# Patient Record
Sex: FEMALE | Race: WHITE | Employment: UNEMPLOYED | URBAN - METROPOLITAN AREA
[De-identification: names, ages, dates, MRNs, and addresses within clinical notes are randomized per-mention and may not be internally consistent; named-entity substitution may affect disease eponyms.]

---

## 2017-11-14 PROBLEM — F31.81 BIPOLAR II DISORDER (HCC): Status: ACTIVE | Noted: 2017-11-14

## 2017-12-19 PROBLEM — F41.9 ANXIETY DISORDER, UNSPECIFIED: Status: ACTIVE | Noted: 2017-12-19

## 2018-03-01 ENCOUNTER — OFFICE VISIT (OUTPATIENT)
Dept: FAMILY MEDICINE CLINIC | Facility: CLINIC | Age: 23
End: 2018-03-01

## 2018-03-01 VITALS
SYSTOLIC BLOOD PRESSURE: 130 MMHG | OXYGEN SATURATION: 98 % | HEART RATE: 89 BPM | TEMPERATURE: 98 F | WEIGHT: 196 LBS | BODY MASS INDEX: 29 KG/M2 | RESPIRATION RATE: 18 BRPM | DIASTOLIC BLOOD PRESSURE: 60 MMHG

## 2018-03-01 DIAGNOSIS — Z02.9 ENCOUNTERS FOR ADMINISTRATIVE PURPOSES: Primary | ICD-10-CM

## 2018-03-01 NOTE — PROGRESS NOTES
Patient reports to clinic for work physical.  She will be working in a  as a . Patient was recently diagnosed with bipolar.   She was recently admitted to inpatient for bipolar and has been seeing a therapist.  Patient is on no medication

## 2018-12-27 PROCEDURE — 88175 CYTOPATH C/V AUTO FLUID REDO: CPT | Performed by: NURSE PRACTITIONER

## 2019-03-06 PROCEDURE — 88305 TISSUE EXAM BY PATHOLOGIST: CPT | Performed by: OBSTETRICS & GYNECOLOGY

## 2025-02-11 NOTE — ED PROVIDER NOTES
Patient Seen in: [facility]      History     Chief Complaint   Patient presents with    Dizziness     Stated Complaint: dizziness that started a few hours ago, similar episodes over the past few weeks    Subjective:   HPI      29-year-old female comes to the hospital having episodes of lightheadedness over the last few hours.  She has had some similar in the past.  Is oftentimes associate with headaches.  She does have a headache now she is rating it a 4 out of 10 but was more intense earlier.  In addition she has sensation of having some floaters in her eyes with this.  She is denying any fevers or chills.  She has no nausea, vomiting or diarrhea.  She has no chest pain or shortness of breath.  She denies any abdominal pains.  She just finished her menstrual cycle which was normal today.  Nothing specific makes things better or worse and she denying any other complaints.    Objective:     Past Medical History:    Bipolar disorder (HCC)    Uterine polyp              Past Surgical History:   Procedure Laterality Date    Other surgical history      removal of uterine polyps                Social History     Socioeconomic History    Marital status: Single   Tobacco Use    Smoking status: Never    Smokeless tobacco: Never   Substance and Sexual Activity    Alcohol use: Yes     Alcohol/week: 8.0 standard drinks of alcohol     Types: 8 Shots of liquor per week     Comment: 1-2 x week    Drug use: No    Sexual activity: Yes     Social Drivers of Health      Received from [facility]                  Physical Exam     ED Triage Vitals [02/10/25 1834]   BP (!) 150/95   Pulse 77   Resp 16   Temp 98.5 °F (36.9 °C)   Temp src Oral   SpO2 100 %   O2 Device None (Room air)       Current Vitals:   Vital Signs  BP: (!) 150/95  Pulse: 77  Resp: 16  Temp: 98.5 °F (36.9 °C)  Temp src: Oral    Oxygen Therapy  SpO2: 100 %  O2 Device: None (Room air)        Physical Exam  HEENT : NCAT,  EOMI, PEERL,  neck supple, no JVD, trachea midline, No LAD  Heart: S1S2 normal. No murmurs, regular rate and rhythm  Lungs: Clear to auscultation bilaterally  Abdomen: Soft nontender nondistended normal active bowel sounds without rebound, guarding or masses noted  Back nontender without CVA tenderness  Extremity no clubbing, cyanosis or edema noted.  Full range of motion noted without tenderness  Neuro: No focal deficits noted    All measures to prevent infection transmission during my interaction with the patient were taken.  The patient was already wearing droplet mask on my arrival to the room.  Personal protective equipment including a droplet mask as well as gloves were worn throughout the duration of my exam.  Hand washing was performed prior to and after the exam.  Stethoscope and equipment used during my examination was cleaned with a super Sani cloth germicidal wipe following the exam.    ED Course     Labs Reviewed   COMP METABOLIC PANEL (14) - Abnormal; Notable for the following components:       Result Value    Glucose 115 (*)     ALT 9 (*)     Bilirubin, Total 0.2 (*)     All other components within normal limits   POCT GLUCOSE - Abnormal; Notable for the following components:    POC Glucose 121 (*)     All other components within normal limits   TROPONIN I HIGH SENSITIVITY - Normal   POCT PREGNANCY URINE - Normal   CBC WITH DIFFERENTIAL WITH PLATELET     EKG    Rate, intervals and axes as noted on EKG Report.  Rate: 73  Rhythm: Sinus Rhythm  Reading: , QRS of 80, patient is normal sinus rhythm without ischemic change.           ED Course as of 02/10/25 2009  ------------------------------------------------------------  Time: 02/10 2007  Comment: While here the patient had a CT brain that was normal.  Patient had a chest x-ray done that I personally interpreted no acute cardiopulmonary process.  Read the radiology report as well.  The patient CBC, CMP were unremarkable.  The patient is feeling  better with IV fluids started.       CT BRAIN OR HEAD (CPT=70450)    Result Date: 2/10/2025  PROCEDURE:  CT BRAIN OR HEAD (82813)  COMPARISON:  None.  INDICATIONS:  dizziness that started a few hours ago, similar episodes over the past few weeks  TECHNIQUE:  Noncontrast CT scanning is performed through the brain. Dose reduction techniques were used. Dose information is transmitted to the ACR (American College of Radiology) NRDR (National Radiology Data Registry) which includes the Dose Index Registry.  PATIENT STATED HISTORY: (As transcribed by Technologist)    dizziness that started a few hours ago, similar episodes over the past few weeks.    FINDINGS:  VENTRICLES/SULCI:  Ventricles and sulci are normal in size.  INTRACRANIAL:  There are no abnormal extraaxial fluid collections.  There is no midline shift.  There are no intraparenchymal brain abnormalities.  There is nothing specific for acute infarct.  There is no hemorrhage or mass lesion.  SINUSES:           No sign of acute sinusitis.  MASTOIDS:          No sign of acute inflammation. SKULL:             No evidence for fracture or osseous abnormality. OTHER:             None.            CONCLUSION:  No acute process.    LOCATION:  [facility]   Dictated by (CST): Jj Martínez MD on 2/10/2025 at 7:54 PM     Finalized by (CST): Jj Martínez MD on 2/10/2025 at 7:55 PM       XR CHEST AP PORTABLE  (CPT=71045)    Result Date: 2/10/2025  PROCEDURE:  XR CHEST AP PORTABLE  (CPT=71045)  TECHNIQUE:  AP chest radiograph was obtained.  COMPARISON:  None.  INDICATIONS:  dizziness that started a few hours ago, similar episodes over the past few weeks  PATIENT STATED HISTORY: (As transcribed by Technologist)  Patient has dizziness that started a few hours ago. Patient stated she had similar episodes over the last few weeks.   FINDINGS:  The heart is normal in size.  The lungs are clear of acute-appearing disease process.  The costophrenic angles are sharp.  There is no  active disease seen on the basis of portable chest radiography.            CONCLUSION:  No active disease seen.   LOCATION:  [facility]      Dictated by (CST): Jj Martínez MD on 2/10/2025 at 7:27 PM     Finalized by (CST): Jj Martínez MD on 2/10/2025 at 7:27 PM        Medications   metoclopramide (Reglan) 5 mg/mL injection 10 mg (10 mg Intravenous Not Given 2/10/25 1844)   ketorolac (Toradol) 15 MG/ML injection 15 mg (15 mg Intravenous Not Given 2/10/25 1844)   diphenhydrAMINE (Benadryl) 50 mg/mL  injection 25 mg (25 mg Intravenous Not Given 2/10/25 1844)   sodium chloride 0.9 % IV bolus 1,000 mL (1,000 mL Intravenous New Bag 2/10/25 1858)            MDM      Differential diagnosis included anemia, electrolyte abnormality, intracranial pathology but not limited such.  The patient's medical workup here has been normal.  She is feeling better in the department at this time we discharged home for further outpatient management care.    Patient was screened and evaluated during this visit.   As a treating physician attending to the patient, I determined, within reasonable clinical confidence and prior to discharge, that an emergency medical condition was not or was no longer present.  There was no indication for further evaluation, treatment or admission on an emergency basis.       The usual and customary discharge instuctions were discussed given the patient's ER course.  We discussed signs and symptoms that should prompt the patient's immediate return to the emergency department.   Reasonable over the counter and prescription treatment options and Physician follow up plan was discussed.       The patient is discharged in good condition.     This note was prepared using Dragon Medical voice recognition dictation software.  As a result errors may occur.  When identified to these areas have been corrected.  While every attempt is made to correct errors during dictation discrepancies may still exist.  Please contact  if there are any errors.          Medical Decision Making      Disposition and Plan     Clinical Impression:  1. Lightheaded    2. Nonintractable headache, unspecified chronicity pattern, unspecified headache type         Disposition:  Discharge  2/10/2025  8:09 pm    Follow-up:  Douglas Moody MD  [address] [address]  [phone]    Schedule an appointment as soon as possible for a visit in 3 day(s)            Medications Prescribed:  There are no discharge medications for this patient.          Supplementary Documentation:

## 2025-02-11 NOTE — ED INITIAL ASSESSMENT (HPI)
Felt lightheaded after taking a walk outside today. Saw black spots. Had this same type episode a couple weeks ago. Also come muscle aches in the last couple weeks

## 2025-07-20 NOTE — DISCHARGE INSTRUCTIONS
Contact your primary care doctor in the morning to arrange follow-up  Rest  Push fluids  Avoid caffeinated products, herbal supplements, diet pills, weight loss pills, or energy drinks  Avoid common stomach irritants like alcohol, cigarette smoke, acidic foods and beverages (especially tomato products, juices, and soda), caffeine, and NSAID medications like Motrin or Aleve  Over-the-counter Pepcid Complete twice daily for the next week    If your symptoms worsen, go to the hospital emergency department

## 2025-07-20 NOTE — ED PROVIDER NOTES
Patient Seen in: [facility] [place]        History  Chief Complaint   Patient presents with    Difficulty Breathing    Chest Pain Angina     Stated Complaint: shortness of breath and chest pain for two weeks, worse with movement    Subjective:   HPI          Patient was to the emergency department back in February complaining of episodes of lightheadedness with similar episodes in the past.  There is additional headache with her symptoms. Full laboratory workup at that time was unremarkable and patient was discharged.   Patient tells me that for almost 2 weeks she has felt the sensation of shortness of breath.  She specifically describes the sensation of not being able to catch her breath at times.  She has not particularly short of breath at this moment.  She has not been sick with stuffy nose, congestion, or cough.  No fever.  It is not painful to breathe.  All day today, patient is also experienced a vague heaviness in the center of her chest.  There is no painful breathing and no positional component to her symptoms.  She denies any recent immobilizations.  No calf pain or swelling.  No birth control pills.  No cigarette smoking.  No personal history or family history of blood clots.  Occasionally over the course of this last week patient has felt a fluttering sensation in her chest.  Again, she does not feel that sensation at this time.      Objective:     Past Medical History:    Bipolar disorder (HCC)    Uterine polyp              No pertinent past surgical history.              Social History     Socioeconomic History    Marital status: Single   Tobacco Use    Smoking status: Never    Smokeless tobacco: Never   Substance and Sexual Activity    Alcohol use: Yes     Alcohol/week: 8.0 standard drinks of alcohol     Types: 8 Shots of liquor per week     Comment: 1-2 x week    Drug use: No    Sexual activity: Yes     Social Drivers of Health      [facility]                                 Physical Exam    ED Triage Vitals [07/20/25 1612]   /88   Pulse 92   Resp 18   Temp 98.5 °F (36.9 °C)   Temp src Temporal   SpO2 100 %   O2 Device None (Room air)       Current Vitals:   Vital Signs  BP: 127/80  Pulse: 71  Resp: 17  Temp: 98.5 °F (36.9 °C)  Temp src: Temporal    Oxygen Therapy  SpO2: 100 %  O2 Device: None (Room air)            Physical Exam  General: The patient is awake, alert, conversant.  She does not appear in any respiratory distress.  Her room air pulse oximetry is 100% and she is not tachycardic nor tachypneic  Eyes: sclera white, conjunctiva pink and moist.  Lids and lashes are normal.  Throat: Posterior pharynx is normal with no JVD  Neck:   Lungs: Clear to auscultation bilaterally.  No rhonchi or rales.  Heart: Normal S1 and S2, without murmur.  Distal pulses are strong and symmetric.  Abdomen: Soft, nondistended.  Completely nontender  Extremities: Unremarkable.  Calves nonswollen, symmetric, nontender.  No pedal edema.  Skin: Not particularly pale  Neurologic:  Mental status as above.  Patient moves all extremities with good strength and coordination.            ED Course  Labs Reviewed   COMP METABOLIC PANEL (14) - Abnormal; Notable for the following components:       Result Value    Bilirubin, Total 0.2 (*)     Albumin 5.1 (*)     All other components within normal limits   CBC WITH DIFFERENTIAL WITH PLATELET - Abnormal; Notable for the following components:    RBC 5.47 (*)     MCH 25.2 (*)     All other components within normal limits   TROPONIN I HIGH SENSITIVITY - Normal   D-DIMER - Normal   POCT PREGNANCY URINE - Normal     EKG    Rate, intervals and axes as noted on EKG Report.  Rate: 80 bpm  Rhythm: Sinus Rhythm  Reading: Sinus rhythm with no ST changes to suggest ischemia or infarct and no KS depression to suggest pericarditis                                MDM     Patient presents with some vague shortness of breath without obvious exam  findings and chest heaviness present all day long.  Symptoms are atypical and risk factors are low for acute coronary syndrome.  Pulmonary embolism include the differential but again seems unlikely without associated findings.  No pleuritic or positional discomforts to suggest pericarditis.   Reflux include the differential but patient without extraordinary symptoms to suggest that    Screening evaluation initiated  Patient was placed on cardiac monitoring and had no arrhythmias over the course of her stay    CBC shows normal white count, hemoglobin, and platelets  Metabolic panel shows normal glucose and electrolytes.  Creatinine normal  Troponin negative  D-dimer negative  Urine pregnancy negative    Chest x-ray  I personally reviewed the actual radiographs themselves and my individual interpretation shows normal heart size and clear lungs  radiologist's formal interpretation which I have reviewed    CONCLUSION:  1.  No acute cardiopulmonary abnormality.          On repeat examination, patient appears comfortable.  I discussed the results of workup.  I do not have an exact etiology for dyspnea this week.  At this point, I believe patient may be safely discharged home.  I recommended follow-up with her primary care doctor.  Outpatient Holter monitoring can be considered as she has been having intermittent palpitations.  If her symptoms were to significantly worsen or change in any way, I would recommend immediate reevaluation.  Contact your primary care doctor in the morning to arrange follow-up  Rest  Push fluids  Avoid caffeinated products, herbal supplements, diet pills, weight loss pills, or energy drinks  Avoid common stomach irritants like alcohol, cigarette smoke, acidic foods and beverages (especially tomato products, juices, and soda), caffeine, and NSAID medications like Motrin or Aleve  Over-the-counter Pepcid Complete twice daily for the next week  If your symptoms worsen, go to the hospital emergency  department  Medical Decision Making      Disposition and Plan     Clinical Impression:  1. Shortness of breath    2. Chest pain, atypical    3. Palpitations         Disposition:  Discharge  7/20/2025  7:00 pm    Follow-up:  José Hayes MD  [address] [place] [address]  [phone]    Call  As needed          Medications Prescribed:  There are no discharge medications for this patient.            Supplementary Documentation:

## 2025-07-20 NOTE — ED INITIAL ASSESSMENT (HPI)
States has two weeks of rickey and chest pain that is worse with movement. No cough or fever, denies any injury also with some intermittent dizziness with these symptoms. States symptoms are constant and do not go away.